# Patient Record
Sex: FEMALE | Race: BLACK OR AFRICAN AMERICAN | NOT HISPANIC OR LATINO | URBAN - METROPOLITAN AREA
[De-identification: names, ages, dates, MRNs, and addresses within clinical notes are randomized per-mention and may not be internally consistent; named-entity substitution may affect disease eponyms.]

---

## 2019-01-15 ENCOUNTER — INPATIENT (INPATIENT)
Facility: HOSPITAL | Age: 29
LOS: 0 days | Discharge: HOME | DRG: 743 | End: 2019-01-16
Attending: OBSTETRICS & GYNECOLOGY | Admitting: OBSTETRICS & GYNECOLOGY
Payer: COMMERCIAL

## 2019-01-15 VITALS
DIASTOLIC BLOOD PRESSURE: 71 MMHG | SYSTOLIC BLOOD PRESSURE: 107 MMHG | WEIGHT: 147.93 LBS | HEART RATE: 76 BPM | HEIGHT: 68 IN | RESPIRATION RATE: 16 BRPM

## 2019-01-15 LAB
BLD GP AB SCN SERPL QL: NEGATIVE — SIGNIFICANT CHANGE UP
RH IG SCN BLD-IMP: POSITIVE — SIGNIFICANT CHANGE UP

## 2019-01-15 RX ORDER — OXYCODONE HYDROCHLORIDE 5 MG/1
10 TABLET ORAL EVERY 6 HOURS
Qty: 0 | Refills: 0 | Status: DISCONTINUED | OUTPATIENT
Start: 2019-01-15 | End: 2019-01-16

## 2019-01-15 RX ORDER — SIMETHICONE 80 MG/1
80 TABLET, CHEWABLE ORAL
Qty: 0 | Refills: 0 | Status: DISCONTINUED | OUTPATIENT
Start: 2019-01-15 | End: 2019-01-16

## 2019-01-15 RX ORDER — ONDANSETRON 8 MG/1
4 TABLET, FILM COATED ORAL ONCE
Qty: 0 | Refills: 0 | Status: COMPLETED | OUTPATIENT
Start: 2019-01-15 | End: 2019-01-16

## 2019-01-15 RX ORDER — BUPIVACAINE 13.3 MG/ML
20 INJECTION, SUSPENSION, LIPOSOMAL INFILTRATION ONCE
Qty: 0 | Refills: 0 | Status: DISCONTINUED | OUTPATIENT
Start: 2019-01-15 | End: 2019-01-16

## 2019-01-15 RX ORDER — SODIUM CHLORIDE 9 MG/ML
1000 INJECTION, SOLUTION INTRAVENOUS
Qty: 0 | Refills: 0 | Status: DISCONTINUED | OUTPATIENT
Start: 2019-01-15 | End: 2019-01-16

## 2019-01-15 RX ORDER — HYDROMORPHONE HYDROCHLORIDE 2 MG/ML
0.5 INJECTION INTRAMUSCULAR; INTRAVENOUS; SUBCUTANEOUS
Qty: 0 | Refills: 0 | Status: DISCONTINUED | OUTPATIENT
Start: 2019-01-15 | End: 2019-01-15

## 2019-01-15 RX ORDER — METOCLOPRAMIDE HCL 10 MG
10 TABLET ORAL EVERY 6 HOURS
Qty: 0 | Refills: 0 | Status: DISCONTINUED | OUTPATIENT
Start: 2019-01-15 | End: 2019-01-16

## 2019-01-15 RX ORDER — OXYCODONE HYDROCHLORIDE 5 MG/1
5 TABLET ORAL EVERY 4 HOURS
Qty: 0 | Refills: 0 | Status: DISCONTINUED | OUTPATIENT
Start: 2019-01-15 | End: 2019-01-16

## 2019-01-15 RX ORDER — CALCIUM CARBONATE 500(1250)
3 TABLET ORAL EVERY 6 HOURS
Qty: 0 | Refills: 0 | Status: DISCONTINUED | OUTPATIENT
Start: 2019-01-15 | End: 2019-01-16

## 2019-01-15 RX ORDER — ACETAMINOPHEN 500 MG
975 TABLET ORAL EVERY 8 HOURS
Qty: 0 | Refills: 0 | Status: DISCONTINUED | OUTPATIENT
Start: 2019-01-15 | End: 2019-01-16

## 2019-01-15 RX ORDER — DOCUSATE SODIUM 100 MG
100 CAPSULE ORAL
Qty: 0 | Refills: 0 | Status: DISCONTINUED | OUTPATIENT
Start: 2019-01-15 | End: 2019-01-16

## 2019-01-15 RX ORDER — KETOROLAC TROMETHAMINE 30 MG/ML
30 SYRINGE (ML) INJECTION EVERY 6 HOURS
Qty: 0 | Refills: 0 | Status: DISCONTINUED | OUTPATIENT
Start: 2019-01-15 | End: 2019-01-16

## 2019-01-15 RX ADMIN — Medication 975 MILLIGRAM(S): at 21:08

## 2019-01-15 RX ADMIN — HYDROMORPHONE HYDROCHLORIDE 0.5 MILLIGRAM(S): 2 INJECTION INTRAMUSCULAR; INTRAVENOUS; SUBCUTANEOUS at 18:03

## 2019-01-15 RX ADMIN — HYDROMORPHONE HYDROCHLORIDE 0.5 MILLIGRAM(S): 2 INJECTION INTRAMUSCULAR; INTRAVENOUS; SUBCUTANEOUS at 18:33

## 2019-01-15 RX ADMIN — Medication 975 MILLIGRAM(S): at 22:08

## 2019-01-15 RX ADMIN — SIMETHICONE 80 MILLIGRAM(S): 80 TABLET, CHEWABLE ORAL at 19:41

## 2019-01-15 RX ADMIN — Medication 30 MILLIGRAM(S): at 20:40

## 2019-01-15 RX ADMIN — Medication 30 MILLIGRAM(S): at 19:40

## 2019-01-15 RX ADMIN — HYDROMORPHONE HYDROCHLORIDE 0.5 MILLIGRAM(S): 2 INJECTION INTRAMUSCULAR; INTRAVENOUS; SUBCUTANEOUS at 18:26

## 2019-01-15 RX ADMIN — HYDROMORPHONE HYDROCHLORIDE 0.5 MILLIGRAM(S): 2 INJECTION INTRAMUSCULAR; INTRAVENOUS; SUBCUTANEOUS at 17:34

## 2019-01-15 RX ADMIN — Medication 100 MILLIGRAM(S): at 19:41

## 2019-01-15 NOTE — BRIEF OPERATIVE NOTE - OPERATION/FINDINGS
Tap block performed with Experal by tiny. Aldana placed in bladder. Abdominal myomectomy performed through Pfannenstiel incision. Approx 10 fibroids removed. Cavity was not entered. Uterus reconstructed with Monocryl. Good hemostasis achieved. Interced placed on uterus.   EBL 300cc

## 2019-01-15 NOTE — BRIEF OPERATIVE NOTE - PROCEDURE
<<-----Click on this checkbox to enter Procedure Myomectomy by abdominal approach  01/15/2019    Active  [email or web address]

## 2019-01-15 NOTE — H&P ADULT - HISTORY OF PRESENT ILLNESS
29y G0  presenting for scheduled for abdominal myomectomy. Reports menorrhagia and discomfort from fibroids.    OB H/x: denies    GYN H/x:  H/x of cysts, fibroids, endometriosis: fibroids  H/x of STIs: denies  Name of GYN Physician: Dr. Parker      PAST MEDICAL & SURGICAL HISTORY:  Fibroids  No significant past surgical history      MEDICATIONS  (STANDING):  OCPs      Allergies    adhesives (Rash)  Drug Allergies Not Recorded          Vital Signs Last 24 Hrs  T(C): --  T(F): --  HR: 76 (15 Simeon 2019 12:28) (76 - 76)  BP: 107/71 (15 Simeon 2019 12:28) (107/71 - 107/71)  BP(mean): --  RR: 16 (15 Simeon 2019 12:28) (16 - 16)  SpO2: --    Physical Exam:  Gen: NAD  GI: soft, nontender, nondistended + BS, no rebound no guarding

## 2019-01-15 NOTE — H&P ADULT - ASSESSMENT
29y G0  presenting for scheduled for abdominal myomectomy.   - consent obtained and all questions answered  - likely to stay till POD #2

## 2019-01-15 NOTE — PROGRESS NOTE ADULT - ASSESSMENT
29y Female POD# 0   s/p  abdominal myomectomy, stable                                      1. Neuro/Pain:  tylenol, toradol, oxycodone  2  CV:   VS per routine  3. Pulm: Encourage ISS  4. GI: AAT  5. :  Aldana in place, TOV in AM  6. Heme: AM CBC  7. ID: --  8. DVT ppx: SCDs  9. Dispo: per attending

## 2019-01-16 VITALS
TEMPERATURE: 99 F | SYSTOLIC BLOOD PRESSURE: 90 MMHG | HEART RATE: 88 BPM | DIASTOLIC BLOOD PRESSURE: 54 MMHG | RESPIRATION RATE: 16 BRPM | OXYGEN SATURATION: 98 %

## 2019-01-16 LAB
BASOPHILS NFR BLD AUTO: 0.1 % — SIGNIFICANT CHANGE UP (ref 0–2)
EOSINOPHIL NFR BLD AUTO: 0 % — SIGNIFICANT CHANGE UP (ref 0–6)
HCT VFR BLD CALC: 32.6 % — LOW (ref 34.5–45)
HGB BLD-MCNC: 10.9 G/DL — LOW (ref 11.5–15.5)
LYMPHOCYTES # BLD AUTO: 17.5 % — SIGNIFICANT CHANGE UP (ref 13–44)
MCHC RBC-ENTMCNC: 30.4 PG — SIGNIFICANT CHANGE UP (ref 27–34)
MCHC RBC-ENTMCNC: 33.4 G/DL — SIGNIFICANT CHANGE UP (ref 32–36)
MCV RBC AUTO: 90.8 FL — SIGNIFICANT CHANGE UP (ref 80–100)
MONOCYTES NFR BLD AUTO: 7.8 % — SIGNIFICANT CHANGE UP (ref 2–14)
NEUTROPHILS NFR BLD AUTO: 74.6 % — SIGNIFICANT CHANGE UP (ref 43–77)
PLATELET # BLD AUTO: 222 K/UL — SIGNIFICANT CHANGE UP (ref 150–400)
RBC # BLD: 3.59 M/UL — LOW (ref 3.8–5.2)
RBC # FLD: 13.2 % — SIGNIFICANT CHANGE UP (ref 10.3–16.9)
WBC # BLD: 8.9 K/UL — SIGNIFICANT CHANGE UP (ref 3.8–10.5)
WBC # FLD AUTO: 8.9 K/UL — SIGNIFICANT CHANGE UP (ref 3.8–10.5)

## 2019-01-16 PROCEDURE — 86900 BLOOD TYPING SEROLOGIC ABO: CPT

## 2019-01-16 PROCEDURE — 36415 COLL VENOUS BLD VENIPUNCTURE: CPT

## 2019-01-16 PROCEDURE — 86901 BLOOD TYPING SEROLOGIC RH(D): CPT

## 2019-01-16 PROCEDURE — 88305 TISSUE EXAM BY PATHOLOGIST: CPT

## 2019-01-16 PROCEDURE — 86850 RBC ANTIBODY SCREEN: CPT

## 2019-01-16 PROCEDURE — 85025 COMPLETE CBC W/AUTO DIFF WBC: CPT

## 2019-01-16 RX ORDER — IBUPROFEN 200 MG
600 TABLET ORAL EVERY 6 HOURS
Qty: 0 | Refills: 0 | Status: DISCONTINUED | OUTPATIENT
Start: 2019-01-16 | End: 2019-01-16

## 2019-01-16 RX ORDER — NORGESTIMATE AND ETHINYL ESTRADIOL 7DAYSX3 LO
1 KIT ORAL
Qty: 0 | Refills: 0 | COMMUNITY

## 2019-01-16 RX ORDER — ZINC OXIDE 200 MG/G
1 OINTMENT TOPICAL
Qty: 0 | Refills: 0 | Status: DISCONTINUED | OUTPATIENT
Start: 2019-01-16 | End: 2019-01-16

## 2019-01-16 RX ADMIN — SIMETHICONE 80 MILLIGRAM(S): 80 TABLET, CHEWABLE ORAL at 06:32

## 2019-01-16 RX ADMIN — OXYCODONE HYDROCHLORIDE 5 MILLIGRAM(S): 5 TABLET ORAL at 03:23

## 2019-01-16 RX ADMIN — OXYCODONE HYDROCHLORIDE 5 MILLIGRAM(S): 5 TABLET ORAL at 14:41

## 2019-01-16 RX ADMIN — Medication 100 MILLIGRAM(S): at 06:32

## 2019-01-16 RX ADMIN — ONDANSETRON 4 MILLIGRAM(S): 8 TABLET, FILM COATED ORAL at 11:12

## 2019-01-16 RX ADMIN — OXYCODONE HYDROCHLORIDE 5 MILLIGRAM(S): 5 TABLET ORAL at 04:23

## 2019-01-16 RX ADMIN — Medication 30 MILLIGRAM(S): at 01:53

## 2019-01-16 RX ADMIN — Medication 600 MILLIGRAM(S): at 11:11

## 2019-01-16 RX ADMIN — Medication 975 MILLIGRAM(S): at 06:32

## 2019-01-16 RX ADMIN — Medication 975 MILLIGRAM(S): at 14:41

## 2019-01-16 RX ADMIN — Medication 30 MILLIGRAM(S): at 07:09

## 2019-01-16 RX ADMIN — Medication 30 MILLIGRAM(S): at 02:53

## 2019-01-16 RX ADMIN — Medication 975 MILLIGRAM(S): at 07:32

## 2019-01-16 RX ADMIN — Medication 30 MILLIGRAM(S): at 07:24

## 2019-01-16 NOTE — DISCHARGE NOTE ADULT - PLAN OF CARE
Recovery Pelvic rest (nothing in vagina) x6 weeks or unless otherwise instructed by physician. Schedule follow up appointment with Dr. Parker in 1-2 weeks. Go to nearest ED if fever >100.4, severe abdominal pain or heavy vaginal bleeding.   Wound care: Showering is allowed, no baths. Do not scrub incision area. Pat and dry all areas where incisions are.   Take Motrin 600mg every 6 hours or Tylenol 500mg every 8 hours as needed for pain

## 2019-01-16 NOTE — PROGRESS NOTE ADULT - ASSESSMENT
30yo POD1 s/p abdominal myomectomy. Pt is hemodynamically stable.                                       1. Neuro/Pain:  torodal, tylenol, oxycodone PRN severe pain   2  CV:   VS per routine , Heplock , hemodynamically stable  3. Pulm: Encourage ISS at least 10 times per hour while awake   4. GI: Diet Reg. Colace, Simethicone    5. :  Voiding   6. Heme: stable   7. DVT ppx: SCDs, ambulate 28yo POD1 s/p abdominal myomectomy. Pt is hemodynamically stable.                                       1. Neuro/Pain:  torodal, tylenol, oxycodone PRN severe pain   2  CV:   VS per routine , Heplock , hemodynamically stable  3. Pulm: Encourage ISS at least 10 times per hour while awake   4. GI: Diet Reg. Colace, Simethicone. Abdominal binder   5. :  Voiding   6. Heme: stable   7. DVT ppx: SCDs, ambulate

## 2019-01-16 NOTE — DISCHARGE NOTE ADULT - CARE PROVIDER_API CALL
Eric Parker (MD), Obstetrics and Gynecology  [address] [place] [address] [address]  [place] [address]  Phone: [phone]  Fax: [phone]

## 2019-01-16 NOTE — PROGRESS NOTE ADULT - SUBJECTIVE AND OBJECTIVE BOX
GYN POC   Patient seen at bedside.  Pain controlled. Tolerating sips.  No OOB yet.  Aldana in place.  Was feeling dizzy earlier but now feels better.    Denies CP, palpitations, SOB, fever, chills, nausea, vomiting.    Vital Signs Last 24 Hrs  T(C): 35.8 (15 Simeon 2019 19:41), Max: 36.7 (15 Simeon 2019 18:54)  T(F): 96.5 (15 Simeon 2019 19:41), Max: 98.1 (15 Simeon 2019 18:54)  HR: 63 (15 Simeon 2019 19:41) (52 - 77)  BP: 107/68 (15 Simeon 2019 19:41) (107/65 - 154/70)  BP(mean): 89 (15 Simeon 2019 18:54) (89 - 89)  RR: 16 (15 Simeon 2019 19:41) (12 - 18)  SpO2: 100% (15 Simeon 2019 19:41) (99% - 100%)    Physical Exam:  Gen: No Acute Distress  Pulm: regular work of breathing  GI: soft, appropriately tender, mildly distended,  no rebound, no guarding.  Dressing C/D/I  : Aldana in place  Ext: SCDs in place, wnl    I&O's Summary    15 Simeon 2019 07:01  -  15 Simeon 2019 21:30  --------------------------------------------------------  IN: 250 mL / OUT: 300 mL / NET: -50 mL      MEDICATIONS  (STANDING):  acetaminophen   Tablet .. 975 milliGRAM(s) Oral every 8 hours  BUpivacaine liposome 1.3% Injectable (no eMAR) 20 milliLiter(s) Local Injection once  docusate sodium 100 milliGRAM(s) Oral two times a day  ketorolac   Injectable 30 milliGRAM(s) IV Push every 6 hours  lactated ringers. 1000 milliLiter(s) (125 mL/Hr) IV Continuous <Continuous>  simethicone 80 milliGRAM(s) Chew two times a day    MEDICATIONS  (PRN):  calcium carbonate    500 mG (Tums) Chewable 3 Tablet(s) Chew every 6 hours PRN Dyspepsia  metoclopramide Injectable 10 milliGRAM(s) IV Push every 6 hours PRN Nausea and/or Vomiting  ondansetron Injectable 4 milliGRAM(s) IV Push once PRN Postoperative Nausea and/or Vomiting  oxyCODONE    IR 5 milliGRAM(s) Oral every 4 hours PRN Moderate Pain (4 - 6)  oxyCODONE    IR 10 milliGRAM(s) Oral every 6 hours PRN Severe Pain (7 - 10)    Allergies    adhesives (Rash)  No Known Drug Allergies    Intolerances
GYN Progress Note    Patient seen at bedside this morning. No issues overnight.   Pain is well controlled on toradol, tylenol and one time oxycodone.   Tolerating  clears , has not passed flatus yet. Not OOB yet. Aldana remains in place.  Denies CP, palpitations, SOB, fever, chills, nausea, vomiting.    Vital Signs Last 24 Hrs  T(C): 36.8 (16 Jan 2019 00:10), Max: 36.8 (16 Jan 2019 00:10)  T(F): 98.2 (16 Jan 2019 00:10), Max: 98.2 (16 Jan 2019 00:10)  HR: 70 (16 Jan 2019 00:10) (52 - 77)  BP: 107/71 (16 Jan 2019 00:10) (107/65 - 154/70)  BP(mean): 89 (15 Simeon 2019 18:54) (89 - 89)  RR: 16 (16 Jan 2019 00:10) (12 - 18)  SpO2: 100% (16 Jan 2019 00:10) (99% - 100%)    Physical Exam:  Gen: No Acute Distress, resting comfortable in bed  Pulm: Normal work of breathing, no wheezes/rhonchi/rales   GI: soft, appropriately tender, nondistended, +BS, no rebound, no guarding, Incision site clean/dry/intact   Ext: SCDs in place, [facility]    I&O's Summary    15 Simeon 2019 07:01  -  16 Jan 2019 06:08  --------------------------------------------------------  IN: 2375 mL / OUT: 550 mL / NET: 1825 mL      MEDICATIONS  (STANDING):  acetaminophen   Tablet .. 975 milliGRAM(s) Oral every 8 hours  BUpivacaine liposome 1.3% Injectable (no eMAR) 20 milliLiter(s) Local Injection once  docusate sodium 100 milliGRAM(s) Oral two times a day  ketorolac   Injectable 30 milliGRAM(s) IV Push every 6 hours  lactated ringers. 1000 milliLiter(s) (125 mL/Hr) IV Continuous <Continuous>  simethicone 80 milliGRAM(s) Chew two times a day    MEDICATIONS  (PRN):  calcium carbonate    500 mG (Tums) Chewable 3 Tablet(s) Chew every 6 hours PRN Dyspepsia  metoclopramide Injectable 10 milliGRAM(s) IV Push every 6 hours PRN Nausea and/or Vomiting  ondansetron Injectable 4 milliGRAM(s) IV Push once PRN Postoperative Nausea and/or Vomiting  oxyCODONE    IR 5 milliGRAM(s) Oral every 4 hours PRN Moderate Pain (4 - 6)  oxyCODONE    IR 10 milliGRAM(s) Oral every 6 hours PRN Severe Pain (7 - 10)      LABS:
Pt seen and examined at bedside. Pt states mild abdominal pain controlled with pain medication. Pt is ambulating, tolerating regular diet, passing flatus, and voiding. Pt states she had a bowel movement too.   Pt denies fever, chills, chest pain, SOB, nausea, vomiting, lightheadedness, or dizziness.      T(F): 97.9 (01-16-19 @ 08:37), Max: 98.2 (01-16-19 @ 00:10)  HR: 85 (01-16-19 @ 08:37) (52 - 85)  BP: 111/65 (01-16-19 @ 08:37) (107/65 - 154/70)  RR: 17 (01-16-19 @ 08:37) (12 - 18)  SpO2: 97% (01-16-19 @ 08:37) (97% - 100%)  Wt(kg): --  I&O's Summary    15 Simeon 2019 07:01  -  16 Jan 2019 07:00  --------------------------------------------------------  IN: 2375 mL / OUT: 1850 mL / NET: 525 mL    16 Jan 2019 07:01  -  16 Jan 2019 13:42  --------------------------------------------------------  IN: 360 mL / OUT: 1100 mL / NET: -740 mL    MEDICATIONS  (STANDING):  acetaminophen   Tablet .. 975 milliGRAM(s) Oral every 8 hours  BUpivacaine liposome 1.3% Injectable (no eMAR) 20 milliLiter(s) Local Injection once  docusate sodium 100 milliGRAM(s) Oral two times a day  ibuprofen  Tablet. 600 milliGRAM(s) Oral every 6 hours  simethicone 80 milliGRAM(s) Chew two times a day  zinc oxide 20% Ointment 1 Application(s) Topical two times a day    MEDICATIONS  (PRN):  calcium carbonate    500 mG (Tums) Chewable 3 Tablet(s) Chew every 6 hours PRN Dyspepsia  metoclopramide Injectable 10 milliGRAM(s) IV Push every 6 hours PRN Nausea and/or Vomiting  oxyCODONE    IR 5 milliGRAM(s) Oral every 4 hours PRN Moderate Pain (4 - 6)  oxyCODONE    IR 10 milliGRAM(s) Oral every 6 hours PRN Severe Pain (7 - 10)    Physical Exam:  Constitutional: NAD  Pulmonary: clear to auscultation bilaterally   Cardiovascular: Regular rate and rhythm   Abdomen: incision site clean, dry, intact. Soft, mildly tender, nondistended, no guarding, no rebound, +bowel sounds  Extremities: no lower extremity edema or calve tenderness. SCDs in place     LABS:                        10.9   8.9   )-----------( 222      ( 16 Jan 2019 06:28 )             32.6

## 2019-01-16 NOTE — DISCHARGE NOTE ADULT - CARE PLAN
Principal Discharge DX:	Fibroids  Goal:	Recovery  Assessment and plan of treatment:	Pelvic rest (nothing in vagina) x6 weeks or unless otherwise instructed by physician. Schedule follow up appointment with Dr. Parker in 1-2 weeks. Go to nearest ED if fever >100.4, severe abdominal pain or heavy vaginal bleeding.   Wound care: Showering is allowed, no baths. Do not scrub incision area. Pat and dry all areas where incisions are.   Take Motrin 600mg every 6 hours or Tylenol 500mg every 8 hours as needed for pain

## 2019-01-16 NOTE — DISCHARGE NOTE ADULT - PATIENT PORTAL LINK FT
You can access the [facility] Patient Portal, offered by [facility], by registering with the following website: [email or web address]

## 2019-01-16 NOTE — PROGRESS NOTE ADULT - ASSESSMENT
29y Female POD#1  s/p abdominal myomectomy                                     1. Neuro/Pain:  torodal, tylenol, oxycodone PRN severe pain   2  CV:   VS per routine , Heplock , hemodynamically stable  3. Pulm: Encourage ISS  4. GI: Reg diet as tolerate, no nausea/vomiting, passing flatus  5. : remove parker this AM, f/u TOV  6. Heme: AM CBC pending  7. ID: --  8. DVT ppx: SCDs, ambulate   9. Dispo: Likely POD#2

## 2019-01-25 DIAGNOSIS — D25.9 LEIOMYOMA OF UTERUS, UNSPECIFIED: ICD-10-CM

## 2019-01-25 LAB — SURGICAL PATHOLOGY STUDY: SIGNIFICANT CHANGE UP

## 2020-07-15 NOTE — PATIENT PROFILE ADULT - BRADEN MOISTURE
Pt for EGD today with Dr. Scar Sams. All additional questions answered. Labs reviewed, assessment unchanged. PT/INR pending  Pt stable for procedure.    Donna Narvaez NP-C (4) rarely moist

## 2020-08-05 NOTE — PACU DISCHARGE NOTE - AIRWAY PATENCY:
----- Message from Emeli Cuenca MD sent at 8/5/2020  8:08 AM CDT -----  Please contact patient in the manner they requested with result/s - the UA shows signs of infection so I prescribed cephalexin antibiotic; wet mount shows no vaginal infections.  
Left message on patient cell vm. To return the clinic call regarding message listed below.   
Patient returned the clinic call aware of result  message listed below. Patient understands verbal instructions given. No additional questions or concerns.    
Satisfactory
